# Patient Record
Sex: FEMALE | Race: WHITE | NOT HISPANIC OR LATINO | Employment: OTHER | URBAN - METROPOLITAN AREA
[De-identification: names, ages, dates, MRNs, and addresses within clinical notes are randomized per-mention and may not be internally consistent; named-entity substitution may affect disease eponyms.]

---

## 2023-12-13 DIAGNOSIS — C83.30 DIFFUSE LARGE B-CELL LYMPHOMA, UNSPECIFIED BODY REGION (MULTI): Primary | ICD-10-CM

## 2023-12-14 ENCOUNTER — LAB (OUTPATIENT)
Dept: LAB | Facility: HOSPITAL | Age: 78
End: 2023-12-14
Payer: MEDICARE

## 2023-12-14 ENCOUNTER — OFFICE VISIT (OUTPATIENT)
Dept: HEMATOLOGY/ONCOLOGY | Facility: HOSPITAL | Age: 78
End: 2023-12-14
Payer: MEDICARE

## 2023-12-14 VITALS
DIASTOLIC BLOOD PRESSURE: 66 MMHG | WEIGHT: 170.6 LBS | RESPIRATION RATE: 17 BRPM | HEART RATE: 101 BPM | TEMPERATURE: 97 F | SYSTOLIC BLOOD PRESSURE: 125 MMHG | BODY MASS INDEX: 31.39 KG/M2 | OXYGEN SATURATION: 98 % | HEIGHT: 62 IN

## 2023-12-14 DIAGNOSIS — C83.31 DIFFUSE LARGE B-CELL LYMPHOMA OF LYMPH NODES OF HEAD (MULTI): Primary | ICD-10-CM

## 2023-12-14 DIAGNOSIS — C83.30 DIFFUSE LARGE B-CELL LYMPHOMA, UNSPECIFIED BODY REGION (MULTI): ICD-10-CM

## 2023-12-14 LAB
ALBUMIN SERPL BCP-MCNC: 4.2 G/DL (ref 3.4–5)
ALP SERPL-CCNC: 97 U/L (ref 33–136)
ALT SERPL W P-5'-P-CCNC: 15 U/L (ref 7–45)
ANION GAP SERPL CALC-SCNC: 12 MMOL/L (ref 10–20)
AST SERPL W P-5'-P-CCNC: 19 U/L (ref 9–39)
BASOPHILS # BLD AUTO: 0.03 X10*3/UL (ref 0–0.1)
BASOPHILS NFR BLD AUTO: 0.4 %
BILIRUB SERPL-MCNC: 0.6 MG/DL (ref 0–1.2)
BUN SERPL-MCNC: 12 MG/DL (ref 6–23)
CALCIUM SERPL-MCNC: 9.2 MG/DL (ref 8.6–10.3)
CHLORIDE SERPL-SCNC: 104 MMOL/L (ref 98–107)
CO2 SERPL-SCNC: 27 MMOL/L (ref 21–32)
CREAT SERPL-MCNC: 0.81 MG/DL (ref 0.5–1.05)
EOSINOPHIL # BLD AUTO: 0.08 X10*3/UL (ref 0–0.4)
EOSINOPHIL NFR BLD AUTO: 1.1 %
ERYTHROCYTE [DISTWIDTH] IN BLOOD BY AUTOMATED COUNT: 12.8 % (ref 11.5–14.5)
GFR SERPL CREATININE-BSD FRML MDRD: 74 ML/MIN/1.73M*2
GLUCOSE SERPL-MCNC: 121 MG/DL (ref 74–99)
HCT VFR BLD AUTO: 39.2 % (ref 36–46)
HGB BLD-MCNC: 12.8 G/DL (ref 12–16)
IMM GRANULOCYTES # BLD AUTO: 0.02 X10*3/UL (ref 0–0.5)
IMM GRANULOCYTES NFR BLD AUTO: 0.3 % (ref 0–0.9)
LDH SERPL L TO P-CCNC: 141 U/L (ref 84–246)
LYMPHOCYTES # BLD AUTO: 1.56 X10*3/UL (ref 0.8–3)
LYMPHOCYTES NFR BLD AUTO: 22.2 %
MCH RBC QN AUTO: 32.6 PG (ref 26–34)
MCHC RBC AUTO-ENTMCNC: 32.7 G/DL (ref 32–36)
MCV RBC AUTO: 100 FL (ref 80–100)
MONOCYTES # BLD AUTO: 0.48 X10*3/UL (ref 0.05–0.8)
MONOCYTES NFR BLD AUTO: 6.8 %
NEUTROPHILS # BLD AUTO: 4.86 X10*3/UL (ref 1.6–5.5)
NEUTROPHILS NFR BLD AUTO: 69.2 %
NRBC BLD-RTO: 0 /100 WBCS (ref 0–0)
PLATELET # BLD AUTO: 276 X10*3/UL (ref 150–450)
POTASSIUM SERPL-SCNC: 3.6 MMOL/L (ref 3.5–5.3)
PROT SERPL-MCNC: 6.7 G/DL (ref 6.4–8.2)
RBC # BLD AUTO: 3.93 X10*6/UL (ref 4–5.2)
SODIUM SERPL-SCNC: 139 MMOL/L (ref 136–145)
WBC # BLD AUTO: 7 X10*3/UL (ref 4.4–11.3)

## 2023-12-14 PROCEDURE — 36415 COLL VENOUS BLD VENIPUNCTURE: CPT

## 2023-12-14 PROCEDURE — 85025 COMPLETE CBC W/AUTO DIFF WBC: CPT

## 2023-12-14 PROCEDURE — 1126F AMNT PAIN NOTED NONE PRSNT: CPT | Performed by: NURSE PRACTITIONER

## 2023-12-14 PROCEDURE — 99213 OFFICE O/P EST LOW 20 MIN: CPT | Performed by: NURSE PRACTITIONER

## 2023-12-14 PROCEDURE — 83615 LACTATE (LD) (LDH) ENZYME: CPT

## 2023-12-14 PROCEDURE — 80053 COMPREHEN METABOLIC PANEL: CPT

## 2023-12-14 ASSESSMENT — PAIN SCALES - GENERAL: PAINLEVEL: 0-NO PAIN

## 2023-12-14 ASSESSMENT — ENCOUNTER SYMPTOMS
OCCASIONAL FEELINGS OF UNSTEADINESS: 0
DEPRESSION: 0
LOSS OF SENSATION IN FEET: 0

## 2023-12-15 PROBLEM — C83.31: Status: ACTIVE | Noted: 2023-12-15

## 2023-12-15 ASSESSMENT — ENCOUNTER SYMPTOMS
HEMATOLOGIC/LYMPHATIC NEGATIVE: 1
MUSCULOSKELETAL NEGATIVE: 1
CONSTITUTIONAL NEGATIVE: 1
RESPIRATORY NEGATIVE: 1
PSYCHIATRIC NEGATIVE: 1
CARDIOVASCULAR NEGATIVE: 1
ENDOCRINE NEGATIVE: 1
NEUROLOGICAL NEGATIVE: 1
GASTROINTESTINAL NEGATIVE: 1
EYES NEGATIVE: 1

## 2023-12-15 NOTE — PROGRESS NOTES
Patient ID: Sayda Serrato is a 78 y.o. female.  Referring Physician: No referring provider defined for this encounter.  Primary Care Provider: No primary care provider on file.  Visit Type: Follow Up      Subjective    Treatment History:   1.  Diffuse large B-cell lymphoma, germinal-center type, CD20,      BCL-2, and BCL6 positive, c-Myc was negative by FISH, but      5% positive by immunohistochemistry. Ki-67 proliferation      index was 90% to 95%. This is a stage IAE involving      the left maxillary sinus, osseous destruction of the anterior      and inferior aspects of the sinus. She received 3 cycles of      R-EPOCH chemotherapy with a restaging PET scan on September 26, 2013, showing no evidence of systemic disease. She received 6 doses of intrathecal methotrexate     She also completed radiation therapy to the      involved area which was initiated on December 30, 2013, and      completed on February 3, 2014 (5-week course which was      completed in [place] which is closer to her home). End of treatment      PET scan on 4/1/14 showed ongoing remission  2.  Past history of cholecystectomy in 1972.  3.  Partial hysterectomy in 1973 due to abnormal Pap smear.    Interval History:   Ms. Serrato returns to clinic today for her regularly scheduled follow up. From a cancer standpoint, she is feeling well. She has lost 30lb since her last visit. Her  is now following a renal diet so she has changed her eating patterns as well.     No fever, chills or night sweats. No lymphadenopathy or masses.         Review of Systems   Constitutional: Negative.    HENT:  Negative.     Eyes: Negative.    Respiratory: Negative.     Cardiovascular: Negative.    Gastrointestinal: Negative.    Endocrine: Negative.    Genitourinary: Negative.     Musculoskeletal: Negative.    Skin: Negative.    Neurological: Negative.    Hematological: Negative.    Psychiatric/Behavioral: Negative.          Objective   BSA: 1.84 meters  "squared  /66   Pulse 101   Temp 36.1 °C (97 °F) (Skin)   Resp 17   Ht (S) 1.575 m (5' 2.01\")   Wt 77.4 kg (170 lb 9.6 oz)   SpO2 98%   BMI 31.20 kg/m²      has no past medical history on file.   has no past surgical history on file.  No family history on file.  Oncology History    No history exists.       Sayda Serrato  reports that she has never smoked. She has never been exposed to tobacco smoke. She has never used smokeless tobacco.  She  reports no history of alcohol use.  She  reports no history of drug use.    Physical Exam  Vitals reviewed.   Constitutional:       Appearance: Normal appearance.   HENT:      Head: Normocephalic and atraumatic.      Nose: Nose normal.      Mouth/Throat:      Mouth: Mucous membranes are moist.      Pharynx: Oropharynx is clear.   Eyes:      Extraocular Movements: Extraocular movements intact.      Conjunctiva/sclera: Conjunctivae normal.      Pupils: Pupils are equal, round, and reactive to light.   Cardiovascular:      Rate and Rhythm: Normal rate and regular rhythm.      Pulses: Normal pulses.      Heart sounds: Normal heart sounds.   Pulmonary:      Effort: Pulmonary effort is normal.      Breath sounds: Normal breath sounds.   Abdominal:      General: Bowel sounds are normal.      Palpations: Abdomen is soft.   Musculoskeletal:         General: Normal range of motion.      Cervical back: Normal range of motion.   Skin:     General: Skin is warm and dry.   Neurological:      General: No focal deficit present.      Mental Status: She is alert and oriented to person, place, and time.   Psychiatric:         Mood and Affect: Mood normal.         Behavior: Behavior normal.         Thought Content: Thought content normal.         Judgment: Judgment normal.         WBC   Date/Time Value Ref Range Status   12/14/2023 10:15 AM 7.0 4.4 - 11.3 x10*3/uL Final   12/15/2022 01:10 PM 6.2 4.4 - 11.3 x10E9/L Final   11/04/2021 01:29 PM 6.8 4.4 - 11.3 x10E9/L Final   10/30/2020 " "12:28 PM 7.7 4.4 - 11.3 x10E9/L Final     nRBC   Date Value Ref Range Status   12/14/2023 0.0 0.0 - 0.0 /100 WBCs Final     RBC   Date Value Ref Range Status   12/14/2023 3.93 (L) 4.00 - 5.20 x10*6/uL Final   12/15/2022 4.25 4.00 - 5.20 x10E12/L Final   11/04/2021 4.20 4.00 - 5.20 x10E12/L Final   10/30/2020 4.27 4.00 - 5.20 x10E12/L Final     Hemoglobin   Date Value Ref Range Status   12/14/2023 12.8 12.0 - 16.0 g/dL Final   12/15/2022 13.0 12.0 - 16.0 g/dL Final   11/04/2021 13.3 12.0 - 16.0 g/dL Final   10/30/2020 13.3 12.0 - 16.0 g/dL Final     Hematocrit   Date Value Ref Range Status   12/14/2023 39.2 36.0 - 46.0 % Final   12/15/2022 39.8 36.0 - 46.0 % Final   11/04/2021 40.6 36.0 - 46.0 % Final   10/30/2020 41.1 36.0 - 46.0 % Final     MCV   Date/Time Value Ref Range Status   12/14/2023 10:15  80 - 100 fL Final   12/15/2022 01:10 PM 94 80 - 100 fL Final   11/04/2021 01:29 PM 97 80 - 100 fL Final   10/30/2020 12:28 PM 96 80 - 100 fL Final     MCH   Date/Time Value Ref Range Status   12/14/2023 10:15 AM 32.6 26.0 - 34.0 pg Final     MCHC   Date/Time Value Ref Range Status   12/14/2023 10:15 AM 32.7 32.0 - 36.0 g/dL Final   12/15/2022 01:10 PM 32.7 32.0 - 36.0 g/dL Final   11/04/2021 01:29 PM 32.8 32.0 - 36.0 g/dL Final   10/30/2020 12:28 PM 32.4 32.0 - 36.0 g/dL Final     RDW   Date/Time Value Ref Range Status   12/14/2023 10:15 AM 12.8 11.5 - 14.5 % Final   12/15/2022 01:10 PM 12.2 11.5 - 14.5 % Final   11/04/2021 01:29 PM 12.3 11.5 - 14.5 % Final   10/30/2020 12:28 PM 12.6 11.5 - 14.5 % Final     Platelets   Date/Time Value Ref Range Status   12/14/2023 10:15  150 - 450 x10*3/uL Final   12/15/2022 01:10  150 - 450 x10E9/L Final   11/04/2021 01:29  150 - 450 x10E9/L Final   10/30/2020 12:28  150 - 450 x10E9/L Final     No results found for: \"MPV\"  Neutrophils %   Date/Time Value Ref Range Status   12/14/2023 10:15 AM 69.2 40.0 - 80.0 % Final   12/15/2022 01:10 PM 66.8 40.0 - 80.0 % " Final   11/04/2021 01:29 PM 64.5 40.0 - 80.0 % Final   10/30/2020 12:28 PM 70.7 40.0 - 80.0 % Final     Immature Granulocytes %, Automated   Date/Time Value Ref Range Status   12/14/2023 10:15 AM 0.3 0.0 - 0.9 % Final     Comment:     Immature Granulocyte Count (IG) includes promyelocytes, myelocytes and metamyelocytes but does not include bands. Percent differential counts (%) should be interpreted in the context of the absolute cell counts (cells/UL).   12/15/2022 01:10 PM 1.0 (H) 0.0 - 0.9 % Final     Comment:      Immature Granulocyte Count (IG) includes promyelocytes,    myelocytes and metamyelocytes but does not include bands.   Percent differential counts (%) should be interpreted in the   context of the absolute cell counts (cells/L).     11/04/2021 01:29 PM 0.3 0.0 - 0.9 % Final     Comment:      Immature Granulocyte Count (IG) includes promyelocytes,    myelocytes and metamyelocytes but does not include bands.   Percent differential counts (%) should be interpreted in the   context of the absolute cell counts (cells/L).     10/30/2020 12:28 PM 0.3 0.0 - 0.9 % Final     Comment:      Immature Granulocyte Count (IG) includes promyelocytes,    myelocytes and metamyelocytes but does not include bands.   Percent differential counts (%) should be interpreted in the   context of the absolute cell counts (cells/L).       Lymphocytes %   Date/Time Value Ref Range Status   12/14/2023 10:15 AM 22.2 13.0 - 44.0 % Final   12/15/2022 01:10 PM 22.2 13.0 - 44.0 % Final   11/04/2021 01:29 PM 26.2 13.0 - 44.0 % Final   10/30/2020 12:28 PM 20.2 13.0 - 44.0 % Final     Monocytes %   Date/Time Value Ref Range Status   12/14/2023 10:15 AM 6.8 2.0 - 10.0 % Final   12/15/2022 01:10 PM 7.7 2.0 - 10.0 % Final   11/04/2021 01:29 PM 6.6 2.0 - 10.0 % Final   10/30/2020 12:28 PM 7.2 2.0 - 10.0 % Final     Eosinophils %   Date/Time Value Ref Range Status   12/14/2023 10:15 AM 1.1 0.0 - 6.0 % Final   12/15/2022 01:10 PM 1.8 0.0 - 6.0 %  Final   11/04/2021 01:29 PM 1.8 0.0 - 6.0 % Final   10/30/2020 12:28 PM 1.2 0.0 - 6.0 % Final     Basophils %   Date/Time Value Ref Range Status   12/14/2023 10:15 AM 0.4 0.0 - 2.0 % Final   12/15/2022 01:10 PM 0.5 0.0 - 2.0 % Final   11/04/2021 01:29 PM 0.6 0.0 - 2.0 % Final   10/30/2020 12:28 PM 0.4 0.0 - 2.0 % Final     Neutrophils Absolute   Date/Time Value Ref Range Status   12/14/2023 10:15 AM 4.86 1.60 - 5.50 x10*3/uL Final     Comment:     Percent differential counts (%) should be interpreted in the context of the absolute cell counts (cells/uL).   12/15/2022 01:10 PM 4.15 1.60 - 5.50 x10E9/L Final   11/04/2021 01:29 PM 4.41 1.60 - 5.50 x10E9/L Final   10/30/2020 12:28 PM 5.47 1.60 - 5.50 x10E9/L Final     Immature Granulocytes Absolute, Automated   Date/Time Value Ref Range Status   12/14/2023 10:15 AM 0.02 0.00 - 0.50 x10*3/uL Final     Lymphocytes Absolute   Date/Time Value Ref Range Status   12/14/2023 10:15 AM 1.56 0.80 - 3.00 x10*3/uL Final   12/15/2022 01:10 PM 1.38 0.80 - 3.00 x10E9/L Final   11/04/2021 01:29 PM 1.79 0.80 - 3.00 x10E9/L Final   10/30/2020 12:28 PM 1.56 0.80 - 3.00 x10E9/L Final     Monocytes Absolute   Date/Time Value Ref Range Status   12/14/2023 10:15 AM 0.48 0.05 - 0.80 x10*3/uL Final   12/15/2022 01:10 PM 0.48 0.05 - 0.80 x10E9/L Final   11/04/2021 01:29 PM 0.45 0.05 - 0.80 x10E9/L Final   10/30/2020 12:28 PM 0.56 0.05 - 0.80 x10E9/L Final     Eosinophils Absolute   Date/Time Value Ref Range Status   12/14/2023 10:15 AM 0.08 0.00 - 0.40 x10*3/uL Final   12/15/2022 01:10 PM 0.11 0.00 - 0.40 x10E9/L Final   11/04/2021 01:29 PM 0.12 0.00 - 0.40 x10E9/L Final   10/30/2020 12:28 PM 0.09 0.00 - 0.40 x10E9/L Final     Basophils Absolute   Date/Time Value Ref Range Status   12/14/2023 10:15 AM 0.03 0.00 - 0.10 x10*3/uL Final   12/15/2022 01:10 PM 0.03 0.00 - 0.10 x10E9/L Final   11/04/2021 01:29 PM 0.04 0.00 - 0.10 x10E9/L Final   10/30/2020 12:28 PM 0.03 0.00 - 0.10 x10E9/L Final  "      No components found for: \"PT\"  No results found for: \"APTT\"    Assessment/Plan         Problem List Items Addressed This Visit             ICD-10-CM    Diffuse large B-cell lymphoma of lymph nodes of head (CMS/HCC) - Primary C83.31     Assessment:    Diffuse large B-cell lymphoma, stage IAE, involving the left maxillary sinus s/p 3 cycles of R- EPOCH, intrathecal MTX and radiation therapy in remission since February 2014. She appears to continue to do well, she has not had major complaints.     No evidence of recurrent disease at today's visit.     Follow up: It has been almost 10 years since she has completed her treatment. She can now follow with her PCP.                  Breanne Pruitt, APRN-CNP                         "

## 2023-12-15 NOTE — ASSESSMENT & PLAN NOTE
Assessment:    Diffuse large B-cell lymphoma, stage IAE, involving the left maxillary sinus s/p 3 cycles of R- EPOCH, intrathecal MTX and radiation therapy in remission since February 2014. She appears to continue to do well, she has not had major complaints.     No evidence of recurrent disease at today's visit.     Follow up: It has been almost 10 years since she has completed her treatment. She can now follow with her PCP.